# Patient Record
Sex: MALE | Race: WHITE | Employment: OTHER | URBAN - METROPOLITAN AREA
[De-identification: names, ages, dates, MRNs, and addresses within clinical notes are randomized per-mention and may not be internally consistent; named-entity substitution may affect disease eponyms.]

---

## 2017-01-16 ENCOUNTER — OFFICE VISIT (OUTPATIENT)
Dept: CARDIOLOGY CLINIC | Age: 67
End: 2017-01-16

## 2017-01-16 VITALS
HEART RATE: 66 BPM | BODY MASS INDEX: 31.96 KG/M2 | SYSTOLIC BLOOD PRESSURE: 138 MMHG | DIASTOLIC BLOOD PRESSURE: 86 MMHG | WEIGHT: 180.4 LBS | HEIGHT: 63 IN | RESPIRATION RATE: 16 BRPM

## 2017-01-16 DIAGNOSIS — I25.10 CORONARY ARTERY DISEASE INVOLVING NATIVE CORONARY ARTERY OF NATIVE HEART WITHOUT ANGINA PECTORIS: ICD-10-CM

## 2017-01-16 DIAGNOSIS — I10 ESSENTIAL HYPERTENSION: Primary | ICD-10-CM

## 2017-01-16 RX ORDER — FUROSEMIDE 20 MG/1
TABLET ORAL
Qty: 90 TAB | Refills: 3 | Status: SHIPPED | OUTPATIENT
Start: 2017-01-16 | End: 2017-01-17 | Stop reason: SDUPTHER

## 2017-01-16 RX ORDER — PROMETHAZINE HYDROCHLORIDE AND CODEINE PHOSPHATE 6.25; 1 MG/5ML; MG/5ML
5 SOLUTION ORAL
Qty: 1 BOTTLE | Refills: 1 | Status: SHIPPED | OUTPATIENT
Start: 2017-01-16 | End: 2018-04-06 | Stop reason: SDUPTHER

## 2017-01-16 RX ORDER — ATENOLOL 25 MG/1
1 TABLET ORAL DAILY
COMMUNITY
Start: 2016-11-08 | End: 2017-05-15

## 2017-01-16 RX ORDER — GABAPENTIN 300 MG/1
1 CAPSULE ORAL DAILY
COMMUNITY
Start: 2016-12-13 | End: 2018-04-06 | Stop reason: ALTCHOICE

## 2017-01-16 NOTE — PROGRESS NOTES
CA[facility] [place]:   [phone]    HPI: Paris Holly, a 74y.o. year-old who presents for follow-up regarding his CAD. He is feeling well. BP is inching up over the last few months. He feels good, no chest pain, has not had to take any ntg. But it is a lot higher, BP was 100/60 6+ mos ago. He is not as active and feels like he has had a little swelling, \"playing bear\" and not getting gout. He has started back on the fluid pill again, the HCTZ. Has a cough, requests the syrup. Also will trial lasix in place of hctz. He underwent a cardiac cath on 3/22/16 and received two TADEO to his prox LAD. Now on Dm Nutrisysem and is down 10 pounds, glucose is doing better, very happy about that. No chest pain, no dyspena, Did stop his aspirin due to skin tears and bleeding, recommended to restart and he will. No bleeding in the stool. A little dizziness with standing up too fast. Bp still low at 100/70 and will stop the HCTZ today. Sleeping is a little better, but still has a sinus cough that bothers him at night. He requests a codeine cough syrup to help. Not exercising but walking or work outside 1-2 hours a day. Assessment/Plan:  1. CAD - s/p TADEO x 2 to proximal LAD on 3/22/16  -does not want to do cardiac rehab,   -discussed heart health diet and weight loss efforts  -continue ASA, Plavix, Atenolol, Lipitor, given Rx for NTG SL tabs PRN chest pain  2. Carotid stenosis- <50% iICA continue ASA and statin   3. Tobacco use- smokes 1.5 packs a day x 55 years work on cessation  4. DM Type 2 - Hgb A1c 10.3 % on last check, on oral agents, followed by PCP, encouraged diet and exercise   5. HTN- low   6. Dyslipidemia- on atorvastatin 20mg daily and Fish Oil 1000mg BID  -lipids 2/16 -, , LDL 24, HDL 29    Cardiac Cath 4/16 - FFR across proximal LAD lesion 0.63 at one minute of adenosine, lesion treated with 2 overlapping 2.75 x 15 Xience Alpine TADEO good result.   Coronary calcium scan - 1228 with triple vessel CAD    Soc retired form insurance at 77, still smoking 1.5 ppd after 55 years, rare etoh  FHx renal failure, cancer, DM    He  has a past medical history of CAD (coronary artery disease); Carotid stenosis; Diabetes ([address] Essential hypertension; and Hyperlipidemia. Cardiovascular ROS: negative for chest pain  Respiratory ROS: negative for shortness of breath  Neurological ROS: no TIA or stroke symptoms  All other systems negative except as above. PE  Vitals:    01/16/17 1431 01/16/17 1442   BP: 140/90 138/86   Pulse: 66    Resp: 16    Weight: 180 lb 6.4 oz (81.8 kg)    Height: 5' 3\" (1.6 m)     Body mass index is 31.96 kg/(m^2).    General appearance - alert, well appearing, and in no distress  Mental status - affect appropriate to mood  Eyes - sclera anicteric, moist mucous membranes  Neck - supple, no significant adenopathy  Lymphatics - no  lymphadenopathy  Chest - clear to auscultation, no wheezes, rales or rhonchi  Heart - normal rate, regular rhythm, normal S1, S2, no murmurs, rubs, clicks or gallops  Abdomen - soft, nontender, nondistended, no masses or organomegaly  Back exam - full range of motion, no tenderness  Neurological - cranial nerves II through XII grossly intact, no focal deficit  Musculoskeletal - no muscular tenderness noted, normal strength  Extremities - peripheral pulses 1+, 1+ pedal edema, right groin without ecchymosis or hematoma  Skin - normal coloration  no rashes    12 lead ECG: NSR    Recent Labs:  No results found for: CHOL, CHOLX, CHLST, CHOLV, 061362, HDL, LDL, DLDL, LDLC, DLDLP, TGL, TGLX, TRIGL, [id number], TRIGP, [facility], [facility]  Lab Results   Component Value Date/Time    Creatinine 1.09 03/16/2016 11:27 AM     Lab Results   Component Value Date/Time    BUN 20 03/16/2016 11:27 AM     Lab Results   Component Value Date/Time    Potassium 4.0 03/16/2016 11:27 AM     Lab Results   Component Value Date/Time    Hemoglobin A1c, External 10.3 02/04/2016     Lab Results   Component Value Date/Time    HGB 16.2 03/16/2016 11:27 AM     Lab Results   Component Value Date/Time    PLATELET 579 78/30/9328 11:27 AM       Reviewed:  Past Medical History   Diagnosis Date    CAD (coronary artery disease)     Carotid stenosis     Diabetes ([facility] [address] [facility]     Essential hypertension     Hyperlipidemia      History   Smoking Status    Current Every Day Smoker    Packs/day: 1.50    Types: Cigarettes   Smokeless Tobacco    Not on file     History   Alcohol Use    0.0 oz/week    0 Standard drinks or equivalent per week     Comment: rare     Allergies   Allergen Reactions    Lisinopril Cough       Current Outpatient Prescriptions   Medication Sig    atenolol (TENORMIN) 25 mg tablet Take 1 Tab by mouth daily.  gabapentin (NEURONTIN) 300 mg capsule Take 1 Cap by mouth daily.  nitroglycerin (NITROSTAT) 0.4 mg SL tablet 1 Tab by SubLINGual route every five (5) minutes as needed for Chest Pain for up to 3 doses.  clopidogrel (PLAVIX) 75 mg tablet Take 1 Tab by mouth daily.  aspirin delayed-release 81 mg tablet Take  by mouth daily.  loratadine (CLARITIN) 10 mg tablet Take 10 mg by mouth daily.  atorvastatin (LIPITOR) 20 mg tablet Take  by mouth daily.  metFORMIN (GLUCOPHAGE) 1,000 mg tablet Take 1,000 mg by mouth two (2) times daily (with meals).  glipiZIDE SR (GLUCOTROL) 10 mg CR tablet Take 10 mg by mouth two (2) times a day.  omega-3 fatty acids-vitamin e 1,000 mg cap Take 1 Cap by mouth two (2) times a day. No current facility-administered medications for this visit.         Leonard Kebede MD  [facility] [place]  [address] [facility]  [address]

## 2017-01-16 NOTE — MR AVS SNAPSHOT
Visit Information Date & Time Provider Department Dept. Phone Encounter #  
 1/16/2017  3:00 PM Toshia Noland, [address] [facility] [phone] [id number] Your Appointments 2/6/2017  2:00 PM  
ECHO CARDIOGRAMS 2D with Abhay Baum [facility] ([facility] SCHEDULING) Appt Note: Per Dr. Junaid Lawrence Echo  
 [address] [facility] [address] [place] [address] [address]  
[address] [facility]  
  
    
 5/15/2017  2:20 PM  
ESTABLISHED PATIENT with Toshia Noland MD  
[facility] ([facility]) Appt Note: 4 month follow up  
 [address]  
[address] [address] Upcoming Health Maintenance Date Due Hepatitis C Screening 1950 DTaP/Tdap/Td series (1 - Tdap) 8/18/1969 FOBT Q 1 YEAR AGE 50-75 8/18/1998 ZOSTER VACCINE AGE 60> 8/18/2008 GLAUCOMA SCREENING Q2Y 8/18/2013 Pneumococcal 65+ Low/Medium Risk (1 of 2 - PCV13) 8/18/2013 MEDICARE YEARLY EXAM 8/18/2013 INFLUENZA AGE 9 TO ADULT 8/1/2016 Allergies as of 1/16/2017  Review Complete On: 1/16/2017 By: Kiel Saucedo Severity Noted Reaction Type Reactions Lisinopril  03/16/2016    Cough Current Immunizations  Never Reviewed No immunizations on file. Not reviewed this visit You Were Diagnosed With   
  
 Codes Comments Essential hypertension    -  Primary ICD-10-CM: I10 
ICD-9-CM: 401.9 Coronary artery disease involving native coronary artery of native heart without angina pectoris     ICD-10-CM: I25.10 ICD-9-CM: 414.01 Vitals BP Pulse Resp Height(growth percentile) Weight(growth percentile) BMI  
 138/86 (BP 1 Location: Right arm, BP Patient Position: Sitting) 66 16 5' 3\" (1.6 m) 180 lb 6.4 oz (81.8 kg) 31.96 kg/m2 Smoking Status Current Every Day Smoker Vitals History BMI and BSA Data Body Mass Index Body Surface Area 31.96 kg/m 2 1.91 m 2 Preferred Pharmacy Pharmacy Name Phone [facility] [address] [place] [address] [phone] Your Updated Medication List  
  
   
This list is accurate as of: 1/16/17  3:28 PM.  Always use your most recent med list.  
  
  
  
  
 aspirin delayed-release 81 mg tablet Take  by mouth daily. atenolol 25 mg tablet Commonly known as:  TENORMIN Take 1 Tab by mouth daily. atorvastatin 20 mg tablet Commonly known as:  LIPITOR Take  by mouth daily. clopidogrel 75 mg Tab Commonly known as:  PLAVIX Take 1 Tab by mouth daily. furosemide 20 mg tablet Commonly known as:  LASIX Take as needed daily for lfuid  
  
 gabapentin 300 mg capsule Commonly known as:  NEURONTIN Take 1 Cap by mouth daily. glipiZIDE SR 10 mg CR tablet Commonly known as:  GLUCOTROL XL Take 10 mg by mouth two (2) times a day. loratadine 10 mg tablet Commonly known as:  Archer Sarah Take 10 mg by mouth daily. metFORMIN 1,000 mg tablet Commonly known as:  GLUCOPHAGE Take 1,000 mg by mouth two (2) times daily (with meals). nitroglycerin 0.4 mg SL tablet Commonly known as:  NITROSTAT  
1 Tab by SubLINGual route every five (5) minutes as needed for Chest Pain for up to 3 doses. omega-3 fatty acids-vitamin e 1,000 mg Cap Take 1 Cap by mouth two (2) times a day. promethazine-codeine 6.25-10 mg/5 mL syrup Commonly known as:  PHENERGAN with CODEINE Take 5 mL by mouth four (4) times daily as needed for Cough. Max Daily Amount: 20 mL. Prescriptions Printed Refills  
 promethazine-codeine (PHENERGAN WITH CODEINE) 6.25-10 mg/5 mL syrup 1 Sig: Take 5 mL by mouth four (4) times daily as needed for Cough. Max Daily Amount: 20 mL. Class: Print  Route: Oral  
  
 Prescriptions Sent to Pharmacy Refills  
 furosemide (LASIX) 20 mg tablet 3 Sig: Take as needed daily for lfuid Class: Normal  
 Pharmacy: [address] [place] [address] #: [phone] We Performed the Following AMB POC EKG ROUTINE W/ 12 LEADS, INTER & REP [29802 CPT(R)] Introducing [facility] & HEALTH SERVICES! Bladimir Rosales introduces [employer] patient portal. Now you can access parts of your medical record, email your doctor's office, and request medication refills online. 1. In your internet browser, go to [email or web address] 2. Click on the First Time User? Click Here link in the Sign In box. You will see the New Member Sign Up page. 3. Enter your [employer] Access Code exactly as it appears below. You will not need to use this code after youve completed the sign-up process. If you do not sign up before the expiration date, you must request a new code. · [employer] Access Code: [email or web address] Expires: 4/16/2017  3:28 PM 
 
4. Enter the last four digits of your Social Security Number (xxxx) and Date of Birth (mm/dd/yyyy) as indicated and click Submit. You will be taken to the next sign-up page. 5. Create a [employer] ID. This will be your [employer] login ID and cannot be changed, so think of one that is secure and easy to remember. 6. Create a [employer] password. You can change your password at any time. 7. Enter your Password Reset Question and Answer. This can be used at a later time if you forget your password. 8. Enter your e-mail address. You will receive e-mail notification when new information is available in [address] 9. Click Sign Up. You can now view and download portions of your medical record. 10. Click the Download Summary menu link to download a portable copy of your medical information. If you have questions, please visit the Frequently Asked Questions section of the [employer] website.  Remember, [employer] is NOT to be used for urgent needs. For medical emergencies, dial 911. Now available from your iPhone and Android! Please provide this summary of care documentation to your next provider. Your primary care clinician is listed as Chris Chairez. If you have any questions after today's visit, please call [phone].

## 2017-01-17 RX ORDER — FUROSEMIDE 20 MG/1
TABLET ORAL
Qty: 90 TAB | Refills: 3 | Status: SHIPPED | OUTPATIENT
Start: 2017-01-17 | End: 2017-05-15 | Stop reason: SDUPTHER

## 2017-02-24 ENCOUNTER — CLINICAL SUPPORT (OUTPATIENT)
Dept: CARDIOLOGY CLINIC | Age: 67
End: 2017-02-24

## 2017-02-24 DIAGNOSIS — I25.10 CORONARY ARTERY DISEASE INVOLVING NATIVE CORONARY ARTERY OF NATIVE HEART WITHOUT ANGINA PECTORIS: ICD-10-CM

## 2017-02-24 DIAGNOSIS — I10 ESSENTIAL HYPERTENSION: Primary | ICD-10-CM

## 2017-03-06 RX ORDER — CLOPIDOGREL BISULFATE 75 MG/1
75 TABLET ORAL DAILY
Qty: 90 TAB | Refills: 3 | Status: SHIPPED | OUTPATIENT
Start: 2017-03-06

## 2017-05-15 ENCOUNTER — OFFICE VISIT (OUTPATIENT)
Dept: CARDIOLOGY CLINIC | Age: 67
End: 2017-05-15

## 2017-05-15 VITALS
BODY MASS INDEX: 32.07 KG/M2 | WEIGHT: 181 LBS | DIASTOLIC BLOOD PRESSURE: 80 MMHG | RESPIRATION RATE: 16 BRPM | HEART RATE: 60 BPM | SYSTOLIC BLOOD PRESSURE: 130 MMHG | HEIGHT: 63 IN

## 2017-05-15 DIAGNOSIS — N52.9 ERECTILE DYSFUNCTION, UNSPECIFIED ERECTILE DYSFUNCTION TYPE: ICD-10-CM

## 2017-05-15 DIAGNOSIS — I65.23 BILATERAL CAROTID ARTERY STENOSIS: ICD-10-CM

## 2017-05-15 DIAGNOSIS — I10 ESSENTIAL HYPERTENSION: ICD-10-CM

## 2017-05-15 DIAGNOSIS — E11.8 TYPE 2 DIABETES MELLITUS WITH COMPLICATION, WITHOUT LONG-TERM CURRENT USE OF INSULIN (HCC): ICD-10-CM

## 2017-05-15 DIAGNOSIS — E78.5 DYSLIPIDEMIA: ICD-10-CM

## 2017-05-15 DIAGNOSIS — I25.10 CORONARY ARTERY DISEASE INVOLVING NATIVE CORONARY ARTERY OF NATIVE HEART WITHOUT ANGINA PECTORIS: Primary | ICD-10-CM

## 2017-05-15 RX ORDER — NITROGLYCERIN 0.4 MG/1
0.4 TABLET SUBLINGUAL
Qty: 1 BOTTLE | Refills: 1 | Status: SHIPPED | OUTPATIENT
Start: 2017-05-15

## 2017-05-15 RX ORDER — FUROSEMIDE 20 MG/1
20 TABLET ORAL 2 TIMES DAILY
Qty: 180 TAB | Refills: 3 | Status: SHIPPED | OUTPATIENT
Start: 2017-05-15 | End: 2018-04-06 | Stop reason: SDUPTHER

## 2017-05-15 RX ORDER — CARVEDILOL 3.12 MG/1
3.12 TABLET ORAL 2 TIMES DAILY WITH MEALS
Qty: 180 TAB | Refills: 3 | Status: SHIPPED | OUTPATIENT
Start: 2017-05-15 | End: 2017-09-25 | Stop reason: ALTCHOICE

## 2017-05-15 NOTE — MR AVS SNAPSHOT
Visit Information Date & Time Provider Department Dept. Phone Encounter #  
 5/15/2017  2:20 PM Jennifer G[facility]mez MD [facility] [place] [phone] [id number] Your Appointments 9/25/2017  3:20 PM  
ESTABLISHED PATIENT with Jennifer Gómez MD  
[facility] ([address]) Appt Note: 4 month follow up  
 [address]  
[address] [facility] [address] Upcoming Health Maintenance Date Due Hepatitis C Screening 1950 DTaP/Tdap/Td series (1 - Tdap) 8/18/1969 FOBT Q 1 YEAR AGE 50-75 8/18/1998 ZOSTER VACCINE AGE 60> 8/18/2008 GLAUCOMA SCREENING Q2Y 8/18/2013 Pneumococcal 65+ Low/Medium Risk (1 of 2 - PCV13) 8/18/2013 MEDICARE YEARLY EXAM 8/18/2013 INFLUENZA AGE 9 TO ADULT 8/1/2017 Allergies as of 5/15/2017  Review Complete On: 5/15/2017 By: Gildardo Acosta Severity Noted Reaction Type Reactions Lisinopril  03/16/2016    Cough Current Immunizations  Never Reviewed No immunizations on file. Not reviewed this visit You Were Diagnosed With   
  
 Codes Comments Coronary artery disease involving native coronary artery of native heart without angina pectoris    -  Primary ICD-10-CM: I25.10 ICD-9-CM: 414.01 Essential hypertension     ICD-10-CM: I10 
ICD-9-CM: 401.9 Bilateral carotid artery stenosis     ICD-10-CM: I65.23 ICD-9-CM: 433.10, 433.30 Dyslipidemia     ICD-10-CM: E78.5 ICD-9-CM: 272.4 Type 2 diabetes mellitus with complication, without long-term current use of insulin (HCC)     ICD-10-CM: E11.8 ICD-9-CM: 250.90 Erectile dysfunction, unspecified erectile dysfunction type     ICD-10-CM: N52.9 ICD-9-CM: 607.84 Vitals  BP Pulse Resp Height(growth percentile) Weight(growth percentile) BMI  
 130/80 (BP 1 Location: Right arm, BP Patient Position: Sitting) 60 16 5' 3\" (1.6 m) 181 lb (82.1 kg) 32.06 kg/m2 Smoking Status Current Every Day Smoker Vitals History BMI and BSA Data Body Mass Index Body Surface Area 32.06 kg/m 2 1.91 m 2 Preferred Pharmacy Pharmacy Name Phone [facility] [address] [place] [address] [phone] Your Updated Medication List  
  
   
This list is accurate as of: 5/15/17  3:07 PM.  Always use your most recent med list.  
  
  
  
  
 aspirin delayed-release 81 mg tablet Take  by mouth daily. atorvastatin 20 mg tablet Commonly known as:  LIPITOR Take  by mouth daily. carvedilol 3.125 mg tablet Commonly known as:  Llana Milo Take 1 Tab by mouth two (2) times daily (with meals). clopidogrel 75 mg Tab Commonly known as:  PLAVIX Take 1 Tab by mouth daily. furosemide 20 mg tablet Commonly known as:  LASIX Take 1 Tab by mouth two (2) times a day.  
  
 gabapentin 300 mg capsule Commonly known as:  NEURONTIN Take 1 Cap by mouth daily. glipiZIDE SR 10 mg CR tablet Commonly known as:  GLUCOTROL XL Take 10 mg by mouth two (2) times a day. loratadine 10 mg tablet Commonly known as:  Janeice Ruths Take 10 mg by mouth daily. metFORMIN 1,000 mg tablet Commonly known as:  GLUCOPHAGE Take 1,000 mg by mouth two (2) times daily (with meals). nitroglycerin 0.4 mg SL tablet Commonly known as:  NITROSTAT  
1 Tab by SubLINGual route every five (5) minutes as needed for Chest Pain for up to 3 doses. omega-3 fatty acids-vitamin e 1,000 mg Cap Take 1 Cap by mouth two (2) times a day. promethazine-codeine 6.25-10 mg/5 mL syrup Commonly known as:  PHENERGAN with CODEINE Take 5 mL by mouth four (4) times daily as needed for Cough. Max Daily Amount: 20 mL. ZyrTEC 10 mg Cap Generic drug:  Cetirizine Take 1 Cap by mouth daily. Prescriptions Sent to Pharmacy Refills carvedilol (COREG) 3.125 mg tablet 3 Sig: Take 1 Tab by mouth two (2) times daily (with meals). Class: Normal  
 Pharmacy: [address] Ph #: [phone] Route: Oral  
 nitroglycerin (NITROSTAT) 0.4 mg SL tablet 1 Si Tab by SubLINGual route every five (5) minutes as needed for Chest Pain for up to 3 doses. Class: Normal  
 Pharmacy: [address] Ph #: [phone] Route: SubLINGual  
 furosemide (LASIX) 20 mg tablet 3 Sig: Take 1 Tab by mouth two (2) times a day. Class: Normal  
 Pharmacy: [address] Ph #: [phone] Route: Oral  
  
We Performed the Following AMB POC EKG ROUTINE W/ 12 LEADS, INTER & REP [74703 CPT(R)] Patient Instructions Please work on smoking cessation Please increase your aerobic/cardiovascular exercise to 30-45 minutes daily 5 days/week Please stop taking Atenolol and begin Carvedilol 3.125mg twice daily starting tomorrow We hope that this will help with your erectile dysfunction You have been given a prescription for Nitroglycerin to take ONLY AS NEEDED for chest pain. You can take one tablet under your tongue for chest pain every 5 minutes up to a total of 3 tablets. If your chest pain is not gone after the 3rd tab, call 9-11 and go to the nearest emergency room. You may take your Lasix 20mg twice daily for swelling - a new prescription was sent to your pharmacy for this. Introducing [facility] & HEALTH SERVICES! Jorden Koenig introduces [employer] patient portal. Now you can access parts of your medical record, email your doctor's office, and request medication refills online. 1. In your internet browser, go to [email or web address] 2. Click on the First Time User? Click Here link in the Sign In box. You will see the New Member Sign Up page. 3. Enter your [employer] Access Code exactly as it appears below. You will not need to use this code after youve completed the sign-up process. If you do not sign up before the expiration date, you must request a new code. · [employer] Access Code: [email or web address] Expires: 8/13/2017  3:04 PM 
 
4. Enter the last four digits of your Social Security Number (xxxx) and Date of Birth (mm/dd/yyyy) as indicated and click Submit. You will be taken to the next sign-up page. 5. Create a [employer] ID. This will be your [employer] login ID and cannot be changed, so think of one that is secure and easy to remember. 6. Create a [employer] password. You can change your password at any time. 7. Enter your Password Reset Question and Answer. This can be used at a later time if you forget your password. 8. Enter your e-mail address. You will receive e-mail notification when new information is available in [address] 9. Click Sign Up. You can now view and download portions of your medical record. 10. Click the Download Summary menu link to download a portable copy of your medical information. If you have questions, please visit the Frequently Asked Questions section of the [employer] website. Remember, [employer] is NOT to be used for urgent needs. For medical emergencies, dial 911. Now available from your iPhone and Android! Please provide this summary of care documentation to your next provider. Your primary care clinician is listed as Navneet Wu. If you have any questions after today's visit, please call [phone].

## 2017-05-15 NOTE — PATIENT INSTRUCTIONS
Please work on smoking cessation  Please increase your aerobic/cardiovascular exercise to 30-45 minutes daily 5 days/week  Please stop taking Atenolol and begin Carvedilol 3.125mg twice daily starting tomorrow  We hope that this will help with your erectile dysfunction  You have been given a prescription for Nitroglycerin to take ONLY AS NEEDED for chest pain. You can take one tablet under your tongue for chest pain every 5 minutes up to a total of 3 tablets. If your chest pain is not gone after the 3rd tab, call 9-11 and go to the nearest emergency room. You may take your Lasix 20mg twice daily for swelling - a new prescription was sent to your pharmacy for this.

## 2017-05-15 NOTE — PROGRESS NOTES
[facility] [place]:   [phone]    HPI: Brenda Champagne, a 74y.o. year-old who presents for follow-up regarding his CAD. He underwent PCI/TADEO to LAD in 3/16  Had two very brief episodes of chest pressure since his last visit, did not use any NTG SL tabs for it   Denies dyspnea with exertion  He did not have any symptoms prior to his PCI, he had an asymptomatic coronary calcium scan because his friend had onepi done  Denies any palpitations  No dizziness or syncope, dizziness better off HCTZ  Doesn't exercise regularly , can mow the grass and weed wack without symptoms  No leg claudication  LE edema doing well on lasix 20mg BID but needs new Rx so that he can take it twice daily  No unusual bleeding or bruising on ASA and Plavix  Has had ED issues since prior to his PCI - has trouble getting an erection and maintaining an erection, never tried ED medications but would be interested in trying it  Recent [facility] done at [facility] about 3 months ago, thinks his A1C was down to 7%    **Office note dated 2/22/17 received after patient's visit  A1C 7.5%  , , LDL 54, HDL 30  BUN 15, Cr 1.0, K 4.9    Assessment/Plan:  1. CAD - s/p TADEO x 2 to proximal LAD on 3/22/16, continue ASA and Plavix for now per patient preference, may consider stopping Plavix in 2018, continue beta blocker and Lipitor, given new Rx for NTG SL tabs PRN chest pain, discussed routine stress testing every 3 or 4 years for surveillance due to his minimal symptoms prior to PCI, follow up in 4 months to reassess  2. Carotid stenosis - 16-49% bilateral stenosis, will plan to repeat carotid duplex in 2018, continue ASA and Lipitor  3. Tobacco use- smokes 1-1.5 packs a day x 55 years, advised him to work on cessation  4. DM Type 2 - Hgb A1c 10.3 % in 2016, on metformin and glipizide, followed by [facility], will request recent Hgb A1C results from their office    5.  HTN- well controlled on atenolol but having some ED so will stop Atenolol and begin Coreg 3.125mg BID, will follow up in 4 months to reassess BP   -will consider starting ARB at next visit for HTN and DM Type 2   -had cough with Lisinopril  6. Dyslipidemia- on atorvastatin 20mg daily and Fish Oil 1000mg BID, will request recent lipid results from [facility]CP[facility]  -lipids 2/16 -, , LDL 24, HDL 29  7. LE edema - stable on lasix 20mg BID  8. ED - will transition Atenolol to Coreg today and reassess at next visit    Echo 2/17 - LVEF 55-60%, no WMA  Cardiac Cath 4/16 - FFR across proximal LAD lesion 0.63 at one minute of adenosine, lesion treated with 2 overlapping 2.75 x 15 Xience Alpine TADEO good result. 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 Cardiovascular ROS: positive for chest pressure   Respiratory ROS: negative for shortness of breath  Neurological ROS: no TIA or stroke symptoms  All other systems negative except as above. PE  Vitals:    05/15/17 1422   BP: 130/80   Pulse: 60   Resp: 16   Weight: 181 lb (82.1 kg)   Height: 5' 3\" (1.6 m)    Body mass index is 32.06 kg/(m^2).    General appearance - alert, well appearing, and in no distress  Mental status - affect appropriate to mood  Eyes - sclera anicteric, moist mucous membranes  Neck - supple, no significant adenopathy  Lymphatics - no  lymphadenopathy  Chest - clear to auscultation, no wheezes, rales or rhonchi  Heart - normal rate, regular rhythm, normal S1, S2, no murmurs, rubs, clicks or gallops  Abdomen - soft, nontender, nondistended, no masses or organomegaly  Back exam - full range of motion, no tenderness  Neurological - cranial nerves II through XII grossly intact, no focal deficit  Musculoskeletal - no muscular tenderness noted, normal strength  Extremities - peripheral pulses 1+, trace LE edema  Skin - normal coloration  no rashes    12 lead ECG: NSR    Recent Labs:  No results found for: CHOL, CHOLX, CHLST, CHOLV, 384385, HDL, LDL, DLDL, LDLC, DLDLP, TGL, Krishna Lowery, [id number], [facility], [facility], [facility]  Lab Results   Component Value Date/Time    Creatinine 1.09 03/16/2016 11:27 AM     Lab Results   Component Value Date/Time    BUN 20 03/16/2016 11:27 AM     Lab Results   Component Value Date/Time    Potassium 4.0 03/16/2016 11:27 AM     Lab Results   Component Value Date/Time    Hemoglobin A1c, External 10.3 02/04/2016     Lab Results   Component Value Date/Time    HGB 16.2 03/16/2016 11:27 AM     Lab Results   Component Value Date/Time    PLATELET 979 98/84/7770 11:27 AM       Reviewed:  Past Medical History:   Diagnosis Date    CAD (coronary artery disease)     Carotid stenosis     Diabetes ([facility] [address]     Essential hypertension     Hyperlipidemia      History   Smoking Status    Current Every Day Smoker    Packs/day: 1.50    Types: Cigarettes   Smokeless Tobacco    Not on file     History   Alcohol Use    0.0 oz/week    0 Standard drinks or equivalent per week     Comment: rare     Allergies   Allergen Reactions    Lisinopril Cough       Current Outpatient Prescriptions   Medication Sig    Cetirizine (ZYRTEC) 10 mg cap Take 1 Cap by mouth daily.  carvedilol (COREG) 3.125 mg tablet Take 1 Tab by mouth two (2) times daily (with meals).  nitroglycerin (NITROSTAT) 0.4 mg SL tablet 1 Tab by SubLINGual route every five (5) minutes as needed for Chest Pain for up to 3 doses.  furosemide (LASIX) 20 mg tablet Take 1 Tab by mouth two (2) times a day.  clopidogrel (PLAVIX) 75 mg tab Take 1 Tab by mouth daily.  gabapentin (NEURONTIN) 300 mg capsule Take 1 Cap by mouth daily.     promethazine-codeine (PHENERGAN WITH CODEINE) 6.25-10 mg/5 mL syrup Take 5 mL by mouth four (4) times daily as needed for Cough. Max Daily Amount: 20 mL.  aspirin delayed-release 81 mg tablet Take  by mouth daily.  atorvastatin (LIPITOR) 20 mg tablet Take  by mouth daily.  metFORMIN (GLUCOPHAGE) 1,000 mg tablet Take 1,000 mg by mouth two (2) times daily (with meals).  glipiZIDE SR (GLUCOTROL) 10 mg CR tablet Take 10 mg by mouth two (2) times a day.  omega-3 fatty acids-vitamin e 1,000 mg cap Take 1 Cap by mouth two (2) times a day.  loratadine (CLARITIN) 10 mg tablet Take 10 mg by mouth daily. No current facility-administered medications for this visit.         Jennifer Gómez MD  [facility] [place]  [facility], [address] [facility] [address]  [facility], [address]

## 2017-09-25 ENCOUNTER — OFFICE VISIT (OUTPATIENT)
Dept: CARDIOLOGY CLINIC | Age: 67
End: 2017-09-25

## 2017-09-25 VITALS
HEIGHT: 63 IN | DIASTOLIC BLOOD PRESSURE: 80 MMHG | WEIGHT: 182.8 LBS | HEART RATE: 66 BPM | BODY MASS INDEX: 32.39 KG/M2 | RESPIRATION RATE: 16 BRPM | SYSTOLIC BLOOD PRESSURE: 110 MMHG

## 2017-09-25 DIAGNOSIS — I10 ESSENTIAL HYPERTENSION: Primary | ICD-10-CM

## 2017-09-25 DIAGNOSIS — E78.5 DYSLIPIDEMIA: ICD-10-CM

## 2017-09-25 DIAGNOSIS — N52.9 ERECTILE DYSFUNCTION, UNSPECIFIED ERECTILE DYSFUNCTION TYPE: ICD-10-CM

## 2017-09-25 DIAGNOSIS — Z71.6 TOBACCO ABUSE COUNSELING: ICD-10-CM

## 2017-09-25 DIAGNOSIS — Z95.5 STATUS POST CORONARY ARTERY STENT PLACEMENT: ICD-10-CM

## 2017-09-25 DIAGNOSIS — I25.10 CORONARY ARTERY DISEASE INVOLVING NATIVE CORONARY ARTERY OF NATIVE HEART WITHOUT ANGINA PECTORIS: ICD-10-CM

## 2017-09-25 DIAGNOSIS — I65.23 BILATERAL CAROTID ARTERY STENOSIS: ICD-10-CM

## 2017-09-25 DIAGNOSIS — Z72.0 TOBACCO ABUSE: ICD-10-CM

## 2017-09-25 NOTE — MR AVS SNAPSHOT
Visit Information Date & Time Provider Department Dept. Phone Encounter #  
 9/25/2017  3:20 PM Corin Mtz, [address] [facility] [phone] [id number] Your Appointments 3/19/2018  2:00 PM  
ESTABLISHED PATIENT with Corin Mtz MD  
[facility] ([facility]) Appt Note: 6m f/u  
 [address] [place] [address]  
[phone] Upcoming Health Maintenance Date Due Hepatitis C Screening 1950 DTaP/Tdap/Td series (1 - Tdap) 8/18/1969 FOBT Q 1 YEAR AGE 50-75 8/18/1998 ZOSTER VACCINE AGE 60> 6/18/2008 GLAUCOMA SCREENING Q2Y 8/18/2013 Pneumococcal 65+ Low/Medium Risk (1 of 2 - PCV13) 8/18/2013 MEDICARE YEARLY EXAM 8/18/2013 INFLUENZA AGE 9 TO ADULT 8/1/2017 Allergies as of 9/25/2017  Review Complete On: 9/25/2017 By: Vana Siemens Severity Noted Reaction Type Reactions Lisinopril  03/16/2016    Cough Current Immunizations  Never Reviewed No immunizations on file. Not reviewed this visit You Were Diagnosed With   
  
 Codes Comments Essential hypertension    -  Primary ICD-10-CM: I10 
ICD-9-CM: 401.9 Coronary artery disease involving native coronary artery of native heart without angina pectoris     ICD-10-CM: I25.10 ICD-9-CM: 414.01 Vitals BP Pulse Resp Height(growth percentile) Weight(growth percentile) BMI  
 110/80 (BP 1 Location: Left arm, BP Patient Position: Sitting) 66 16 5' 3\" (1.6 m) 182 lb 12.8 oz (82.9 kg) 32.38 kg/m2 Smoking Status Current Every Day Smoker Vitals History BMI and BSA Data Body Mass Index Body Surface Area  
 32.38 kg/m 2 1.92 m 2 Preferred Pharmacy Pharmacy Name Phone [facility] [address] [place] [address] [phone] Your Updated Medication List  
  
   
 This list is accurate as of: 9/25/17  4:07 PM.  Always use your most recent med list.  
  
  
  
  
 aspirin delayed-release 81 mg tablet Take  by mouth daily. atorvastatin 20 mg tablet Commonly known as:  LIPITOR Take  by mouth daily. clopidogrel 75 mg Tab Commonly known as:  PLAVIX Take 1 Tab by mouth daily. furosemide 20 mg tablet Commonly known as:  LASIX Take 1 Tab by mouth two (2) times a day.  
  
 gabapentin 300 mg capsule Commonly known as:  NEURONTIN Take 1 Cap by mouth daily. glipiZIDE SR 10 mg CR tablet Commonly known as:  GLUCOTROL XL Take 10 mg by mouth two (2) times a day. loratadine 10 mg tablet Commonly known as:  Radha Buddle Take 10 mg by mouth daily. metFORMIN 1,000 mg tablet Commonly known as:  GLUCOPHAGE Take 1,000 mg by mouth two (2) times daily (with meals). nitroglycerin 0.4 mg SL tablet Commonly known as:  NITROSTAT  
1 Tab by SubLINGual route every five (5) minutes as needed for Chest Pain for up to 3 doses. omega-3 fatty acids-vitamin e 1,000 mg Cap Take 1 Cap by mouth two (2) times a day. promethazine-codeine 6.25-10 mg/5 mL syrup Commonly known as:  PHENERGAN with CODEINE Take 5 mL by mouth four (4) times daily as needed for Cough. Max Daily Amount: 20 mL. ZyrTEC 10 mg Cap Generic drug:  Cetirizine Take 1 Cap by mouth daily. We Performed the Following AMB POC EKG ROUTINE W/ 12 LEADS, INTER & REP [31128 CPT(R)] Introducing [facility] & HEALTH SERVICES! Sheela Moore introduces [employer] patient portal. Now you can access parts of your medical record, email your doctor's office, and request medication refills online. 1. In your internet browser, go to [email or web address] 2. Click on the First Time User? Click Here link in the Sign In box. You will see the New Member Sign Up page. 3. Enter your [employer] Access Code exactly as it appears below.  You will not need to use this code after youve completed the sign-up process. If you do not sign up before the expiration date, you must request a new code. · "[employer] Access Code: [email or web address] Expires: 12/24/2017  4:07 PM 
 
4. Enter the last four digits of your Social Security Number (xxxx) and Date of Birth (mm/dd/yyyy) as indicated and click Submit. You will be taken to the next sign-up page. 5. Create a "[employer] ID. This will be your "[employer] login ID and cannot be changed, so think of one that is secure and easy to remember. 6. Create a "[employer] password. You can change your password at any time. 7. Enter your Password Reset Question and Answer. This can be used at a later time if you forget your password. 8. Enter your e-mail address. You will receive e-mail notification when new information is available in [address] 9. Click Sign Up. You can now view and download portions of your medical record. 10. Click the Download Summary menu link to download a portable copy of your medical information. If you have questions, please visit the Frequently Asked Questions section of the "[employer] website. Remember, "[employer] is NOT to be used for urgent needs. For medical emergencies, dial 911. Now available from your iPhone and Android! Please provide this summary of care documentation to your next provider. Your primary care clinician is listed as Amber Fleming. If you have any questions after today's visit, please call [phone].

## 2017-09-25 NOTE — PROGRESS NOTES
[facility] [place]:   [phone]    HPI: Renetta Munoz, a 69y.o. year-old who presents for follow-up regarding his CAD. He underwent PCI/TADEO to LAD in 3/16  No more chest apin and no SL NTG use. He has had a little mucus but usin the mucinex and doing fine. Playing golf and staying active, doing well. Denies dyspnea with exertion, no palps  He did not have any symptoms prior to his PCI, he had an asymptomatic coronary calcium scan because his friend had one done  Denies any palpitations  No dizziness or syncope, dizziness better off HCTZ  Doesn't exercise regularly , can mow the grass and weed wack without symptoms  No leg claudication  LE edema doing well on lasix 20mg BID occasional flares up  No unusual bleeding or bruising on ASA and Plavix  Has had ED issues since prior to his PCI - has trouble getting an erection and maintaining an erection, never tried ED medications but would be interested in trying it  [facility] about 3 months ago, thinks his A1C was down to 7%    **Office note dated 2/22/17 received after patient's visit  A1C 7.5%  , , LDL 54, HDL 30  BUN 15, Cr 1.0, K 4.9    Assessment/Plan:  1. CAD - s/p TADEO x 2 to proximal LAD on 3/22/16, continue ASA and Plavix for now per patient preference, may consider stopping Plavix in 2018, continue beta blocker and Lipitor, given new Rx for NTG SL tabs PRN chest pain, discussed routine stress testing every 3 or 4 years for surveillance due to his minimal symptoms prior to PCI, follow up in 6 months to reassess  -follow-up 6 months and watch bp in the interim. 2. Carotid stenosis - 16-49% bilateral stenosis, will plan to repeat carotid duplex in 2018, continue ASA and Lipitor  3. Tobacco use- smokes 1-1.5 packs a day x 55 years, advised him to work on cessation  4.  DM Type 2 - Hgb A1c 10.3 % in 2016, on metformin and glipizide, [facility], will request recent Hgb A1C results from their office    5. HTN- well controlled on atenolol but having some ED so will stop Atenolol and begin Coreg 3.125mg BID, will follow up in 4 months to reassess BP   -will consider starting ARB at next visit for HTN and DM Type 2   -had cough with Lisinopril  6. Dyslipidemia- on atorvastatin 20mg daily and Fish Oil 1000mg BID, will request recent lipid results from [facility]  -lipids 2/16 -, , LDL 24, HDL 29  7. LE edema - stable on lasix 20mg BID  8. ED - transitioned to coreg and he wants to stop it and follow his BP. Echo 2/17 - LVEF 55-60%, no WMA  Cardiac Cath 4/16 - FFR across proximal LAD lesion 0.63 at one minute of adenosine, lesion treated with 2 overlapping 2.75 x 15 Xience Alpine TADEO good result. 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 Cardiovascular ROS: positive for chest pressure   Respiratory ROS: negative for shortness of breath  Neurological ROS: no TIA or stroke symptoms  All other systems negative except as above. PE  Vitals:    09/25/17 1523   BP: 110/80   Pulse: 66   Resp: 16   Weight: 182 lb 12.8 oz (82.9 kg)   Height: 5' 3\" (1.6 m)    Body mass index is 32.38 kg/(m^2).    General appearance - alert, well appearing, and in no distress  Mental status - affect appropriate to mood  Eyes - sclera anicteric, moist mucous membranes  Neck - supple, no significant adenopathy  Lymphatics - no  lymphadenopathy  Chest - clear to auscultation, no wheezes, rales or rhonchi  Heart - normal rate, regular rhythm, normal S1, S2, no murmurs, rubs, clicks or gallops  Abdomen - soft, nontender, nondistended, no masses or organomegaly  Back exam - full range of motion, no tenderness  Neurological - cranial nerves II through XII grossly intact, no focal deficit  Musculoskeletal - no muscular tenderness noted, normal strength  Extremities - peripheral pulses 1+, trace LE edema  Skin - normal coloration  no rashes    12 lead ECG: NSR    Recent Labs:  No results found for: CHOL, CHOLX, CHLST, CHOLV, 308892, HDL, LDL, LDLC, DLDLP, TGLX, TRIGL, TRIGP, CHHD, [facility]  Lab Results   Component Value Date/Time    Creatinine 1.09 03/16/2016 11:27 AM     Lab Results   Component Value Date/Time    BUN 20 03/16/2016 11:27 AM     Lab Results   Component Value Date/Time    Potassium 4.0 03/16/2016 11:27 AM     Lab Results   Component Value Date/Time    Hemoglobin A1c, External 10.3 02/04/2016     Lab Results   Component Value Date/Time    HGB 16.2 03/16/2016 11:27 AM     Lab Results   Component Value Date/Time    PLATELET 742 31/51/8838 11:27 AM       Reviewed:  Past Medical History:   Diagnosis Date    CAD (coronary artery disease)     Carotid stenosis     Diabetes ([facility] [address]     Essential hypertension     Hyperlipidemia      History   Smoking Status    Current Every Day Smoker    Packs/day: 1.50    Types: Cigarettes   Smokeless Tobacco    Former User    Types: Chew     History   Alcohol Use    0.0 oz/week    0 Standard drinks or equivalent per week     Comment: rare     Allergies   Allergen Reactions    Lisinopril Cough       Current Outpatient Prescriptions   Medication Sig    carvedilol (COREG) 3.125 mg tablet Take 1 Tab by mouth two (2) times daily (with meals).  nitroglycerin (NITROSTAT) 0.4 mg SL tablet 1 Tab by SubLINGual route every five (5) minutes as needed for Chest Pain for up to 3 doses.  furosemide (LASIX) 20 mg tablet Take 1 Tab by mouth two (2) times a day.  clopidogrel (PLAVIX) 75 mg tab Take 1 Tab by mouth daily.  gabapentin (NEURONTIN) 300 mg capsule Take 1 Cap by mouth daily.     promethazine-codeine (PHENERGAN WITH CODEINE) 6.25-10 mg/5 mL syrup Take 5 mL by mouth four (4) times daily as needed for Cough. Max Daily Amount: 20 mL.  aspirin delayed-release 81 mg tablet Take  by mouth daily.  loratadine (CLARITIN) 10 mg tablet Take 10 mg by mouth daily.  atorvastatin (LIPITOR) 20 mg tablet Take  by mouth daily.  metFORMIN (GLUCOPHAGE) 1,000 mg tablet Take 1,000 mg by mouth two (2) times daily (with meals).  glipiZIDE SR (GLUCOTROL) 10 mg CR tablet Take 10 mg by mouth two (2) times a day.  omega-3 fatty acids-vitamin e 1,000 mg cap Take 1 Cap by mouth two (2) times a day.  Cetirizine (ZYRTEC) 10 mg cap Take 1 Cap by mouth daily. No current facility-administered medications for this visit.         Adalberto Stephens MD  [address] [facility] heart and Vascular [place]  [address], [address] [facility] [address]  [address]

## 2018-04-06 ENCOUNTER — OFFICE VISIT (OUTPATIENT)
Dept: CARDIOLOGY CLINIC | Age: 68
End: 2018-04-06

## 2018-04-06 VITALS
SYSTOLIC BLOOD PRESSURE: 138 MMHG | WEIGHT: 180 LBS | HEART RATE: 72 BPM | HEIGHT: 63 IN | DIASTOLIC BLOOD PRESSURE: 82 MMHG | BODY MASS INDEX: 31.89 KG/M2

## 2018-04-06 DIAGNOSIS — R05.9 COUGH: Primary | ICD-10-CM

## 2018-04-06 RX ORDER — PROMETHAZINE HYDROCHLORIDE AND CODEINE PHOSPHATE 6.25; 1 MG/5ML; MG/5ML
5 SOLUTION ORAL
Qty: 1 BOTTLE | Refills: 1 | Status: SHIPPED | OUTPATIENT
Start: 2018-04-06 | End: 2019-11-18 | Stop reason: SDUPTHER

## 2018-04-06 RX ORDER — FUROSEMIDE 20 MG/1
20 TABLET ORAL 2 TIMES DAILY
Qty: 180 TAB | Refills: 3 | Status: SHIPPED | OUTPATIENT
Start: 2018-04-06 | End: 2019-11-18 | Stop reason: SDUPTHER

## 2018-04-06 NOTE — PROGRESS NOTES
[facility] [place]:   [phone]    HPI: Red Ortiz, a 69y.o. year-old who presents for follow-up regarding his CAD. He is feeling pretty well. Working outside on his flowers a lot, moving rocks and weeding and everything. No NTG use, no chest pain, he feels great. A1C up but he is getting back on his diet. Lbs last week with Dr. Michelle Speak will request.     He underwent PCI/TADEO to LAD in 3/16  No more chest apin and no SL NTG use. He has had a little mucus but usin the mucinex and doing fine. Playing golf and staying active, doing well. Denies dyspnea with exertion, no palps  He did not have any symptoms prior to his PCI, he had an asymptomatic coronary calcium scan because his friend had one done  Denies any palpitations  No dizziness or syncope, dizziness better off HCTZ  Doesn't exercise regularly , can mow the grass and weed wack without symptoms  No leg claudication  LE edema doing well on lasix 20mg BID occasional flares up  No unusual bleeding or bruising on ASA and Plavix     **Office note dated 2/22/17 received after patient's visit  A1C 7.5%  , , LDL 54, HDL 30  BUN 15, Cr 1.0, K 4.9    Assessment/Plan:  1. CAD - s/p TADEO x 2 to proximal LAD on 3/22/16, continue ASA and Plavix for now per patient preference, may consider stopping Plavix in 2018, continue beta blocker and Lipitor, given new Rx for NTG SL tabs PRN chest pain, discussed routine stress testing every few years for surveillance due to his minimal symptoms prior to PCI, follow up in 6 months to reassess  -follow-up 6 months and watch bp in the interim. 2. Carotid stenosis - 16-49% bilateral stenosis,   continue ASA and Lipitor  3. Tobacco use- smokes 1-1.5 packs a day x 55 years, advised him to work on cessation  4. DM Type 2 - Hgb A1c 10.3 % in 2016, on metformin and glipizide, followed by [facility], will request recent Hgb A1C results from their office    5.  HTN- well controlled now and not on bblocker at all due to ED concerns and sebastian/fatigue  -will consider starting ARB at next visit for HTN and DM Type 2   -had cough with Lisinopril  6. Dyslipidemia- on atorvastatin 20mg daily and Fish Oil 1000mg BID, will request recent lipid results from [facility]CP[facility]  -lipids at goal  7. LE edema - stable on lasix 20mg BID  8. ED - better off bblocker    Echo 2/17 - LVEF 55-60%, no WMA  Cardiac Cath 4/16 - FFR across proximal LAD lesion 0.63 at one minute of adenosine, lesion treated with 2 overlapping 2.75 x 15 Xience Alpine TADEO good result. Coronary calcium scan - 1228 with triple vessel CAD  Carotid duplex 2/16 - 16-49% bilateral ICA stenosis    Soc retired form insurance at 77, still smoking 1.5 ppd after 55 years, rare etoh  FHx renal failure, cancer, DM    He  has a past medical history of CAD (coronary artery disease); Carotid stenosis; Diabetes ([address] Essential hypertension; and Hyperlipidemia. Cardiovascular ROS: positive for chest pressure   Respiratory ROS: negative for shortness of breath  Neurological ROS: no TIA or stroke symptoms  All other systems negative except as above. PE  Vitals:    04/06/18 1456   BP: 138/82   Pulse: 72   Weight: 180 lb (81.6 kg)   Height: 5' 3\" (1.6 m)    Body mass index is 31.89 kg/(m^2).    General appearance - alert, well appearing, and in no distress  Mental status - affect appropriate to mood  Eyes - sclera anicteric, moist mucous membranes  Neck - supple, no significant adenopathy  Lymphatics - no  lymphadenopathy  Chest - clear to auscultation, no wheezes, rales or rhonchi  Heart - normal rate, regular rhythm, normal S1, S2, no murmurs, rubs, clicks or gallops  Abdomen - soft, nontender, nondistended, no masses or organomegaly  Back exam - full range of motion, no tenderness  Neurological - cranial nerves II through XII grossly intact, no focal deficit  Musculoskeletal - no muscular tenderness noted, normal strength  Extremities - peripheral pulses 1+, trace LE edema  Skin - normal coloration  no rashes    12 lead ECG: NSR    Recent Labs:  No results found for: CHOL, CHOLX, CHLST, CHOLV, 428950, HDL, LDL, LDLC, DLDLP, TGLX, TRIGL, TRIGP, CHHD, [facility]  Lab Results   Component Value Date/Time    Creatinine 1.09 03/16/2016 11:27 AM     Lab Results   Component Value Date/Time    BUN 20 03/16/2016 11:27 AM     Lab Results   Component Value Date/Time    Potassium 4.0 03/16/2016 11:27 AM     Lab Results   Component Value Date/Time    Hemoglobin A1c, External 10.3 02/04/2016     Lab Results   Component Value Date/Time    HGB 16.2 03/16/2016 11:27 AM     Lab Results   Component Value Date/Time    PLATELET 920 28/88/3359 11:27 AM       Reviewed:  Past Medical History:   Diagnosis Date    CAD (coronary artery disease)     Carotid stenosis     [address] [address]     Essential hypertension     Hyperlipidemia      History   Smoking Status    Current Every Day Smoker    Packs/day: 1.50    Types: Cigarettes   Smokeless Tobacco    Former User    Types: Chew     History   Alcohol Use    0.0 oz/week    0 Standard drinks or equivalent per week     Comment: rare     Allergies   Allergen Reactions    Lisinopril Cough       Current Outpatient Prescriptions   Medication Sig    nitroglycerin (NITROSTAT) 0.4 mg SL tablet 1 Tab by SubLINGual route every five (5) minutes as needed for Chest Pain for up to 3 doses.  furosemide (LASIX) 20 mg tablet Take 1 Tab by mouth two (2) times a day.  promethazine-codeine (PHENERGAN WITH CODEINE) 6.25-10 mg/5 mL syrup Take 5 mL by mouth four (4) times daily as needed for Cough. Max Daily Amount: 20 mL.  aspirin delayed-release 81 mg tablet Take  by mouth daily.  loratadine (CLARITIN) 10 mg tablet Take 10 mg by mouth daily.  atorvastatin (LIPITOR) 20 mg tablet Take  by mouth daily.  metFORMIN (GLUCOPHAGE) 1,000 mg tablet Take 1,000 mg by mouth two (2) times daily (with meals).     glipiZIDE SR (GLUCOTROL) 10 mg CR tablet Take 10 mg by mouth two (2) times a day.  Cetirizine (ZYRTEC) 10 mg cap Take 1 Cap by mouth daily.  clopidogrel (PLAVIX) 75 mg tab Take 1 Tab by mouth daily.  gabapentin (NEURONTIN) 300 mg capsule Take 1 Cap by mouth daily.  omega-3 fatty acids-vitamin e 1,000 mg cap Take 1 Cap by mouth two (2) times a day. No current facility-administered medications for this visit.         Constantino Chairez MD  [facility] heart and Vascular [place]  [address] [facility] [address]  [address]

## 2018-04-06 NOTE — PROGRESS NOTES
Chief Complaint   Patient presents with    Hypertension    Coronary Artery Disease     Verified patient with two types of identifiers. Verified medications with the patient.     Verified patient's pharmacy

## 2019-01-15 ENCOUNTER — OFFICE VISIT (OUTPATIENT)
Dept: CARDIOLOGY CLINIC | Age: 69
End: 2019-01-15

## 2019-01-15 VITALS
WEIGHT: 182.4 LBS | HEIGHT: 63 IN | HEART RATE: 76 BPM | BODY MASS INDEX: 32.32 KG/M2 | SYSTOLIC BLOOD PRESSURE: 140 MMHG | DIASTOLIC BLOOD PRESSURE: 80 MMHG | RESPIRATION RATE: 16 BRPM

## 2019-01-15 DIAGNOSIS — I25.10 CORONARY ARTERY DISEASE INVOLVING NATIVE CORONARY ARTERY OF NATIVE HEART WITHOUT ANGINA PECTORIS: ICD-10-CM

## 2019-01-15 DIAGNOSIS — Z95.5 STATUS POST CORONARY ARTERY STENT PLACEMENT: ICD-10-CM

## 2019-01-15 DIAGNOSIS — Z72.0 TOBACCO ABUSE: ICD-10-CM

## 2019-01-15 DIAGNOSIS — I10 ESSENTIAL HYPERTENSION: ICD-10-CM

## 2019-01-15 DIAGNOSIS — E11.8 TYPE 2 DIABETES MELLITUS WITH COMPLICATION, WITHOUT LONG-TERM CURRENT USE OF INSULIN (HCC): ICD-10-CM

## 2019-01-15 DIAGNOSIS — E78.5 DYSLIPIDEMIA: ICD-10-CM

## 2019-01-15 DIAGNOSIS — R06.09 DOE (DYSPNEA ON EXERTION): Primary | ICD-10-CM

## 2019-01-15 DIAGNOSIS — N52.9 ERECTILE DYSFUNCTION, UNSPECIFIED ERECTILE DYSFUNCTION TYPE: ICD-10-CM

## 2019-01-15 NOTE — PROGRESS NOTES
[facility] [place]:   [phone]    HPI: Constanza Garces, a 77y.o. year-old who presents for follow-up regarding his CAD. He is feeling pretty well. Not getting outside with the cold now. Has played a bit of golf. He hibernates in the cold, playing on the computer now. Bp is ok today, 140/80. Not checking glucose at home. Still struggling with getting the glucose under control. Last itime he checked the A1C he was back on the Nutrasystem and weight was down to 163 and during that time his A1C was down to 7.1. But he felt like he looked hollow so will juvenal 170-175 his goal with a healthier diet. Seems to be smoking more sitting at the computer in the garage, etc.     He underwent PCI/TADEO to LAD in 3/16  No more chest pain and no SL NTG use. Denies dyspnea with exertion, no palps  He did not have any symptoms prior to his PCI, he had an asymptomatic coronary calcium scan because his friend had one done  Denies any palpitations  No dizziness or syncope, dizziness better off HCTZ  Doesn't exercise regularly, can mow the grass and weed wack without symptoms  No leg claudication  LE edema doing well on lasix 20mg BID occasional flares up  No unusual bleeding or bruising on ASA and Plavix     **Office note dated 2/22/17 received after patient's visit  A1C 7.5%  , , LDL 54, HDL 30  BUN 15, Cr 1.0, K 4.9    Assessment/Plan:  1. CAD - s/p TADEO x 2 to proximal LAD on 3/22/16, continue ASA and Plavix for now per patient preference  -given new Rx for NTG SL tabs PRN chest pain  - minimal symptoms prior to PCI, follow up in 6 months to reassess  - will go ahead a repeat   2. Carotid stenosis - 16-49% bilateral stenosis,   continue ASA and Lipitor  3. Tobacco use- smokes 1-1.5 packs a day x 55 years, advised him to work on cessation  4. DM Type 2 - Hgb A1c 10.3 % in 2016, on metformin and glipizide, followed by [facility], will request recent Hgb A1C results from their office    5.  HTN- well controlled now and not on bblocker at all due to ED concerns and sebastian/fatigue  -will consider starting ARB at next visit for HTN and DM Type 2   -had cough with Lisinopril  6. Dyslipidemia- on atorvastatin 20mg daily and Fish Oil 1000mg BID, will request recent lipid results from [facility]CP[facility]  -lipids at goal  7. LE edema - stable on lasix 20mg BID  8. ED - better off bblocker  9. ZUNIGA mild, but less active, multifactorial, smoking, etc.     Echo 2/17 - LVEF 55-60%, no WMA  Cardiac Cath 4/16 - FFR across proximal LAD lesion 0.63 at one minute of adenosine, lesion treated with 2 overlapping 2.75 x 15 Xience Alpine TADEO good result. 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 Cardiovascular ROS: positive for chest pressure   Respiratory ROS: negative for shortness of breath  Neurological ROS: no TIA or stroke symptoms  All other systems negative except as above. PE  Vitals:    01/15/19 1042   BP: 140/80   Pulse: 76   Resp: 16   Weight: 182 lb 6.4 oz (82.7 kg)   Height: 5' 3\" (1.6 m)    Body mass index is 32.31 kg/m².    General appearance - alert, well appearing, and in no distress  Mental status - affect appropriate to mood  Eyes - sclera anicteric, moist mucous membranes  Neck - supple, no significant adenopathy  Lymphatics - no  lymphadenopathy  Chest - clear to auscultation, no wheezes, rales or rhonchi  Heart - normal rate, regular rhythm, normal S1, S2, no murmurs, rubs, clicks or gallops  Abdomen - soft, nontender, nondistended, no masses or organomegaly  Back exam - full range of motion, no tenderness  Neurological - cranial nerves II through XII grossly intact, no focal deficit  Musculoskeletal - no muscular tenderness noted, normal strength  Extremities - peripheral pulses 1+, trace LE edema  Skin - normal coloration  no rashes    12 lead ECG: NSR    Recent Labs:  No results found for: CHOL, CHOLX, CHLST, CHOLV, 109443, HDL, LDL, LDLC, DLDLP, Suanne Oas, CHHD, [facility]  Lab Results   Component Value Date/Time    Creatinine 1.09 03/16/2016 11:27 AM     Lab Results   Component Value Date/Time    BUN 20 03/16/2016 11:27 AM     Lab Results   Component Value Date/Time    Potassium 4.0 03/16/2016 11:27 AM     Lab Results   Component Value Date/Time    Hemoglobin A1c, External 10.3 02/04/2016     Lab Results   Component Value Date/Time    HGB 16.2 03/16/2016 11:27 AM     Lab Results   Component Value Date/Time    PLATELET 580 60/42/6804 11:27 AM       Reviewed:  Past Medical History:   Diagnosis Date    CAD (coronary artery disease)     Carotid stenosis     Diabetes (HCC)     Essential hypertension     Hyperlipidemia      Social History     Tobacco Use   Smoking Status Current Every Day Smoker    Packs/day: 1.50    Types: Cigarettes   Smokeless Tobacco Former User    Types: Chew     Social History     Substance and Sexual Activity   Alcohol Use No    Alcohol/week: 0.0 oz     Allergies   Allergen Reactions    Lisinopril Cough       Current Outpatient Medications   Medication Sig    furosemide (LASIX) 20 mg tablet Take 1 Tab by mouth two (2) times a day.  promethazine-codeine (PHENERGAN WITH CODEINE) 6.25-10 mg/5 mL syrup Take 5 mL by mouth four (4) times daily as needed for Cough. Max Daily Amount: 20 mL.  nitroglycerin (NITROSTAT) 0.4 mg SL tablet 1 Tab by SubLINGual route every five (5) minutes as needed for Chest Pain for up to 3 doses.  aspirin delayed-release 81 mg tablet Take  by mouth daily.  loratadine (CLARITIN) 10 mg tablet Take 10 mg by mouth daily.  atorvastatin (LIPITOR) 20 mg tablet Take  by mouth daily.     metFORMIN (GLUCOPHAGE) 1,000 mg tablet Take 1,000 mg by mouth two (2) times daily (with meals).  glipiZIDE SR (GLUCOTROL) 10 mg CR tablet Take 10 mg by mouth two (2) times a day.  Cetirizine (ZYRTEC) 10 mg cap Take 1 Cap by mouth daily.  clopidogrel (PLAVIX) 75 mg tab Take 1 Tab by mouth daily.  omega-3 fatty acids-vitamin e 1,000 mg cap Take 1 Cap by mouth two (2) times a day. No current facility-administered medications for this visit.         Megan Finley MD  [facility] heart and Vascular [place]  [address] [facility] [address]  [address]

## 2019-11-18 ENCOUNTER — OFFICE VISIT (OUTPATIENT)
Dept: CARDIOLOGY CLINIC | Age: 69
End: 2019-11-18

## 2019-11-18 VITALS
DIASTOLIC BLOOD PRESSURE: 88 MMHG | WEIGHT: 182 LBS | HEIGHT: 63 IN | OXYGEN SATURATION: 97 % | RESPIRATION RATE: 16 BRPM | BODY MASS INDEX: 32.25 KG/M2 | HEART RATE: 99 BPM | SYSTOLIC BLOOD PRESSURE: 136 MMHG

## 2019-11-18 DIAGNOSIS — R05.9 COUGH: ICD-10-CM

## 2019-11-18 DIAGNOSIS — R06.09 DOE (DYSPNEA ON EXERTION): ICD-10-CM

## 2019-11-18 DIAGNOSIS — I10 ESSENTIAL HYPERTENSION: Primary | ICD-10-CM

## 2019-11-18 DIAGNOSIS — Z72.0 TOBACCO ABUSE: ICD-10-CM

## 2019-11-18 DIAGNOSIS — I25.10 CORONARY ARTERY DISEASE INVOLVING NATIVE CORONARY ARTERY OF NATIVE HEART WITHOUT ANGINA PECTORIS: ICD-10-CM

## 2019-11-18 DIAGNOSIS — E11.8 TYPE 2 DIABETES MELLITUS WITH COMPLICATION, WITHOUT LONG-TERM CURRENT USE OF INSULIN (HCC): ICD-10-CM

## 2019-11-18 DIAGNOSIS — E78.5 DYSLIPIDEMIA: ICD-10-CM

## 2019-11-18 RX ORDER — PROMETHAZINE HYDROCHLORIDE AND CODEINE PHOSPHATE 6.25; 1 MG/5ML; MG/5ML
5 SOLUTION ORAL
Qty: 1 BOTTLE | Refills: 1 | Status: SHIPPED | OUTPATIENT
Start: 2019-11-18 | End: 2019-11-23

## 2019-11-18 RX ORDER — FUROSEMIDE 20 MG/1
20 TABLET ORAL 2 TIMES DAILY
Qty: 180 TAB | Refills: 3 | Status: SHIPPED | OUTPATIENT
Start: 2019-11-18

## 2019-11-18 NOTE — PROGRESS NOTES
[facility] [place]:   [phone]    HPI: Joseph Berger, a 68y.o. year-old who presents for follow-up regarding his CAD. BP is up today but has been on the road all night, normally running 117/80. A little lazy and has some dyspnea with exertion but overall not doing bad. Less energetic. He can do things but just doesn't want too. He having sinus trouble and cough and congestion. He doesn't want to do a stress test so will do it next time. He is feeling pretty well. Not getting outside with the cold now. Has played a bit of golf. He hibernates in the cold. Bp is ok today, 140/80. Not checking glucose at home. Still struggling with getting the glucose under control. More sitting. He underwent PCI/TADEO to LAD in 3/16  No more chest pain and no SL NTG use. Denies dyspnea with exertion, no palps  He did not have any symptoms prior to his PCI, he had an asymptomatic coronary calcium scan because his friend had one done  Denies any palpitations  No dizziness or syncope, dizziness better off HCTZ  Doesn't exercise regularly, can mow the grass and weed wack without symptoms  No leg claudication  LE edema doing well on lasix 20mg BID occasional flares up  No unusual bleeding or bruising on ASA and Plavix     **Office note dated 2/22/17 received after patient's visit  A1C 7.5%  , , LDL 54, HDL 30  BUN 15, Cr 1.0, K 4.9    Assessment/Plan:  1. CAD - s/p TADEO x 2 to proximal LAD on 3/22/16, continue ASA and Plavix for now per patient preference  -given new Rx for NTG SL tabs PRN chest pain  - minimal symptoms prior to PCI, follow up in 6 months to reassess  - will go ahead and repeat his stress echo for cad progression. 2. Carotid stenosis - 16-49% bilateral stenosis,   continue ASA and Lipitor  3. Tobacco use- smokes 1-1.5 packs a day x 55 years, advised him to work on cessation  4.  DM Type 2 - Hgb A1c 10.3 % in 2016, on metformin and glipizide, followed by [facility], will request recent Hgb A1C results from their office    5. HTN- well controlled now and not on bblocker at all due to ED concerns and sebastian/fatigue  -will consider starting ARB at next visit for HTN and DM Type 2   -had cough with Lisinopril  6. Dyslipidemia- on atorvastatin 20mg daily and Fish Oil 1000mg BID, will request recent lipid results from [facility]CP[facility]  -lipids at goal  7. LE edema - stable on lasix 20mg BID  8. ED - better off bblocker  9. ZUNIGA mild, but less active, multifactorial, smoking, etc.     Echo 2/17 - LVEF 55-60%, no WMA  Cardiac Cath 4/16 - FFR across proximal LAD lesion 0.63 at one minute of adenosine, lesion treated with 2 overlapping 2.75 x 15 Xience Alpine TADEO good result. Coronary calcium scan - 1228 with triple vessel CAD  Carotid duplex 2/16 - 16-49% bilateral ICA stenosis    Soc retired form insurance at 77, still smoking 1.5 ppd after 55 years, rare etoh  FHx renal failure, cancer, DM    He  has a past medical history of CAD (coronary artery disease), Carotid stenosis, Diabetes ([address] Essential hypertension, and Hyperlipidemia. Cardiovascular ROS: positive for chest pressure   Respiratory ROS: negative for shortness of breath  Neurological ROS: no TIA or stroke symptoms  All other systems negative except as above. PE  Vitals:    11/18/19 1537 11/18/19 1552   BP: (!) 148/96 (!) 154/96   Pulse: 99    Resp: 16    SpO2: 97%    Weight: 182 lb (82.6 kg)    Height: 5' 3\" (1.6 m)     Body mass index is 32.24 kg/m².    General appearance - alert, well appearing, and in no distress  Mental status - affect appropriate to mood  Eyes - sclera anicteric, moist mucous membranes  Neck - supple, no significant adenopathy  Lymphatics - no  lymphadenopathy  Chest - clear to auscultation, no wheezes, rales or rhonchi  Heart - normal rate, regular rhythm, normal S1, S2, no murmurs, rubs, clicks or gallops  Abdomen - soft, nontender, nondistended, no masses or organomegaly  Back exam - full range of motion, no tenderness  Neurological - cranial nerves II through XII grossly intact, no focal deficit  Musculoskeletal - no muscular tenderness noted, normal strength  Extremities - peripheral pulses 1+, trace LE edema  Skin - normal coloration  no rashes    12 lead ECG: NSR    Recent Labs:  No results found for: CHOL, CHOLX, CHLST, CHOLV, 325269, HDL, HDLP, LDL, LDLC, DLDLP, TGLX, TRIGL, TRIGP, CHHD, [facility]  Lab Results   Component Value Date/Time    Creatinine 1.09 03/16/2016 11:27 AM     Lab Results   Component Value Date/Time    BUN 20 03/16/2016 11:27 AM     Lab Results   Component Value Date/Time    Potassium 4.0 03/16/2016 11:27 AM     Lab Results   Component Value Date/Time    Hemoglobin A1c, External 10.3 02/04/2016     Lab Results   Component Value Date/Time    HGB 16.2 03/16/2016 11:27 AM     Lab Results   Component Value Date/Time    PLATELET 710 77/51/3041 11:27 AM       Reviewed:  Past Medical History:   Diagnosis Date    CAD (coronary artery disease)     Carotid stenosis     Diabetes (HCC)     Essential hypertension     Hyperlipidemia      Social History     Tobacco Use   Smoking Status Current Every Day Smoker    Packs/day: 1.50    Types: Cigarettes   Smokeless Tobacco Former User    Types: Chew     Social History     Substance and Sexual Activity   Alcohol Use No    Alcohol/week: 0.0 standard drinks     Allergies   Allergen Reactions    Lisinopril Cough       Current Outpatient Medications   Medication Sig    furosemide (LASIX) 20 mg tablet Take 1 Tab by mouth two (2) times a day.  promethazine-codeine (PHENERGAN WITH CODEINE) 6.25-10 mg/5 mL syrup Take 5 mL by mouth four (4) times daily as needed for Cough. Max Daily Amount: 20 mL.  nitroglycerin (NITROSTAT) 0.4 mg SL tablet 1 Tab by SubLINGual route every five (5) minutes as needed for Chest Pain for up to 3 doses.  aspirin delayed-release 81 mg tablet Take  by mouth daily.     atorvastatin (LIPITOR) 20 mg tablet Take  by mouth daily.  metFORMIN (GLUCOPHAGE) 1,000 mg tablet Take 1,000 mg by mouth two (2) times daily (with meals).  glipiZIDE SR (GLUCOTROL) 10 mg CR tablet Take 10 mg by mouth two (2) times a day.  clopidogrel (PLAVIX) 75 mg tab Take 1 Tab by mouth daily. No current facility-administered medications for this visit.         Justin Hernandez MD  [facility] heart and Vascular [place]  [address] [facility] [address]  [address]

## 2020-05-11 ENCOUNTER — TELEPHONE (OUTPATIENT)
Dept: CARDIOLOGY CLINIC | Age: 70
End: 2020-05-11

## 2020-05-11 NOTE — TELEPHONE ENCOUNTER
5/11/2020 at 10:27-lm call to  stress echo & same day appt w/Dr. Davina Ch on 5/19 to sometime in July-possibly 7/7 11:00 SE & 11:40 w/Dr. Davina Ch or 7/9 10:00 SE & 11:00 w/Dr. Davina Ch

## 2020-06-30 ENCOUNTER — TELEPHONE (OUTPATIENT)
Dept: CARDIOLOGY CLINIC | Age: 70
End: 2020-06-30

## 2020-06-30 NOTE — TELEPHONE ENCOUNTER
Left msg for patient per treadmill protocol pt must get covid test prior to walking on treadmill.   Message stated for pt to call the office back for further instructions and locations for covid test.  Covid test must be done on July 1 or 2 at the very latest.